# Patient Record
Sex: MALE | URBAN - METROPOLITAN AREA
[De-identification: names, ages, dates, MRNs, and addresses within clinical notes are randomized per-mention and may not be internally consistent; named-entity substitution may affect disease eponyms.]

---

## 2017-06-11 ENCOUNTER — EMERGENCY (EMERGENCY)
Facility: HOSPITAL | Age: 27
LOS: 1 days | Discharge: HOME | End: 2017-06-11
Attending: EMERGENCY MEDICINE | Admitting: EMERGENCY MEDICINE
Payer: COMMERCIAL

## 2017-06-11 VITALS
OXYGEN SATURATION: 100 % | DIASTOLIC BLOOD PRESSURE: 70 MMHG | TEMPERATURE: 98 F | HEART RATE: 66 BPM | RESPIRATION RATE: 16 BRPM | SYSTOLIC BLOOD PRESSURE: 129 MMHG

## 2017-06-11 DIAGNOSIS — F43.10 POST-TRAUMATIC STRESS DISORDER, UNSPECIFIED: ICD-10-CM

## 2017-06-11 DIAGNOSIS — R69 ILLNESS, UNSPECIFIED: ICD-10-CM

## 2017-06-11 PROCEDURE — 90792 PSYCH DIAG EVAL W/MED SRVCS: CPT

## 2017-06-11 PROCEDURE — 99284 EMERGENCY DEPT VISIT MOD MDM: CPT | Mod: 25

## 2017-06-11 PROCEDURE — 99053 MED SERV 10PM-8AM 24 HR FAC: CPT

## 2017-06-11 NOTE — ED BEHAVIORAL HEALTH ASSESSMENT NOTE - DETAILS
no si at this time, he made a suicidal statement to his friend Friday night honerable discharge under general conditions in 2011 PTSD, falsh back called sister, pt did not want me to wake up his friend.

## 2017-06-11 NOTE — ED BEHAVIORAL HEALTH ASSESSMENT NOTE - DESCRIPTION (FIRST USE, LAST USE, QUANTITY, FREQUENCY, DURATION)
uses half pack, wants to quit been drinking heavy in past, now for past one year once a month uses of 2 glass wine

## 2017-06-11 NOTE — ED BEHAVIORAL HEALTH ASSESSMENT NOTE - HPI (INCLUDE ILLNESS QUALITY, SEVERITY, DURATION, TIMING, CONTEXT, MODIFYING FACTORS, ASSOCIATED SIGNS AND SYMPTOMS)
28 YO  male, with past history of PTSD with one past hospitalization in 2009 after having "mental breakdown" no past h/o suicidal attempt, currently on meds Depakote 125 BID for mood instability and PTSD symptoms, h/o alcohol use heavy in past however for past 1 year has been drinking once a week, denies h/o withdrawals or seizures. 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 her was BIB  for an evaluation after his friend calls EMS because he made a suicidal statement over phone Friday night and when he did not  phone all day Saturday and late in the evening friend got worried and called EMS    during the interview pt was calm, cooperative, he reported that he was intoxicated Friday night and he was having flash back and was getting depressed about his time in [place] and ended up calling his friend with the thoughts of wanting to harm self with knife, however his friend was able to talk him out of it and he went to sleep. he slept most of the day Saturday and did not pay much attention to his phone, untill  showed up in the middle of night to pick him up for an evaluation.   he reported that he suppers from PTSD and has symptoms of hyper vigilance, anxiety, and depression. however he feels his symptoms are at his baseline and there is no aggravation of his symptoms. he also reports up and down mood, irritability and anger as his major issues.   at this time he denies any si/hi. no AVH. no delusions. at this time he does not have any acute manic/hypomanic or psychotic symptoms 28 YO  male, with past history of PTSD with one past hospitalization in 2009 after having "mental breakdown" no past h/o suicidal attempt, currently on meds Depakote 125 BID for mood instability and PTSD symptoms, h/o alcohol use heavy in past however for past 1 year has been drinking once a week, denies h/o withdrawals or seizures. 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 her was BIB  for an evaluation after his friend calls EMS because he made a suicidal statement over phone Friday night and when he did not  phone all day Saturday and late in the evening friend got worried and called EMS    during the interview pt was calm, cooperative, he reported that he was intoxicated Friday night and he was having flash back and was getting depressed about his time in [place] and ended up calling his friend with the thoughts of wanting to harm self with knife, however his friend was able to talk him out of it and he went to sleep. he slept most of the day Saturday and did not pay much attention to his phone, until  showed up in the middle of night to pick him up for an evaluation.   he reported that he suppers from PTSD and has symptoms of hyper vigilance, anxiety, and depression. however he feels his symptoms are at his baseline and there is no aggravation of his symptoms. he also reports up and down mood, irritability and anger as his major issues.   at this time he denies any si/hi. no AVH. no delusions. at this time he does not have any acute manic/hypomanic or psychotic symptoms

## 2017-06-11 NOTE — ED PROVIDER NOTE - PHYSICAL EXAMINATION
no LE edema, normal equal distal pulses.  no clonus/rigidity/tremors/fasciculations   L lateral foot slightly ttp

## 2017-06-11 NOTE — ED ADULT TRIAGE NOTE - CHIEF COMPLAINT QUOTE
Pt comes for psych eval, as per EMS pt has PTSD from [place], and called his friend last night intoxicated and threatening to kill himself with a knife to his neck , friend talked him out of it and told him he'd call him today, as per EMS friend unable to get a hold of him so called to .. patient sober calm and cooperative at this time and voluntarily came in for a psych eval, spoke to , patient to go back to  at this time

## 2017-06-11 NOTE — ED ADULT NURSE REASSESSMENT NOTE - NS ED NURSE REASSESS COMMENT FT1
Psychiatric eval completed, awaiting medical eval and disposition. Pt resting in stretcher, remains calm and cooperative when awake. Will continue to monitor for safety.
Pt evaluated and cleared by Pattie DAN and psychiatry for d/c. Pt remains awake, calm, denies s/i h/i or a/v/h. D/c instructions  and cab provided for safe transfer to home.

## 2017-06-11 NOTE — ED BEHAVIORAL HEALTH ASSESSMENT NOTE - SUMMARY
28 YO  male, with past history of PTSD with one past hospitalization in 2009 after having "mental breakdown" no past h/o suicidal attempt, currently on meds Depakote 125 BID for mood instability and PTSD symptoms, h/o alcohol use heavy in past however for past 1 year has been drinking once a week, denies h/o withdrawals or seizures. 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 her was BIB  for an evaluation after his friend calls EMS because he made a suicidal statement over phone Friday night and when he did not  phone all day Saturday and late in the evening friend got worried and called EMS. currently he is not suicidal, denies any hi. no AVH. no delusions. does not need inpt hsopitalization

## 2017-06-11 NOTE — ED ADULT NURSE NOTE - OBJECTIVE STATEMENT
BIB ems from home after patient's friend called 911 for reports of suicidal statements made by patient while intoxicated yesterday. Pt denies current s/i h/i or a/v/h. Denies drug/etoh intox, last drink was "friday" as per patient, no hx of w/d or seizures. Pt is awake, a&ox3, calm and cooperative.

## 2017-06-11 NOTE — ED BEHAVIORAL HEALTH ASSESSMENT NOTE - RISK ASSESSMENT
low risk, pt does not have any si/hi at this time. no symptoms of depression, psychosis. no acute exacerbation of PTSD symptoms. 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 he has supportive family. no past history of attempting suicide, no access to firearms of weapons

## 2017-06-11 NOTE — ED PROVIDER NOTE - OBJECTIVE STATEMENT
Eval in   27M per triage note "Pt comes for psych eval, as per EMS pt has PTSD from [place], and called his friend last night intoxicated and threatening to kill himself with a knife to his neck , friend talked him out of it and told him he'd call him today, as per EMS friend unable to get a hold of him so called to .. patient sober calm and cooperative at this time and voluntarily came in for a psych eval, spoke to , patient to go back to  at this time."  Per pt, he drinks occasionally and often has SI only when drinking. Drank yesterday and called friend. Pt now only c/o L lateral foot pain s/p inversion 2d ago. Denies any other medical complaints. No HA, SOb/CP, NVD, abd pain, urinary complaints, suicide attempt.

## 2017-06-11 NOTE — ED BEHAVIORAL HEALTH ASSESSMENT NOTE - SUICIDE PROTECTIVE FACTORS
Positive therapeutic relationships/Engaged in work or school/Supportive social network or family/Responsibility to family and others/Identifies reasons for living/Future oriented/Other

## 2022-11-12 NOTE — ED PROVIDER NOTE - MEDICAL DECISION MAKING DETAILS
Yes
27M PMH PTSD p/w SI while intoxicated 1d ago, now completely resolved no SI and asymptomatic other than L foot pain s/p inversion 2d ago. Vitals wnl, exam as above.   Recommneded XR for foot ttp, pt states its improving and doesn't want XR.   Medically and psychiatrically cleared for outpt pmd/psych f/u.

## 2023-11-10 NOTE — ED BEHAVIORAL HEALTH ASSESSMENT NOTE - NS ED BHA REVIEW OF ED CHART AVAILABLE IMAGING REVIEWED
Dr. Canelo Haynes at bedside. Patient awake and not responding but screamed out w  administration of IV Ativan by JANUSZ.      Ray Linder RN  11/10/23 0806 None available